# Patient Record
Sex: FEMALE | Race: AMERICAN INDIAN OR ALASKA NATIVE
[De-identification: names, ages, dates, MRNs, and addresses within clinical notes are randomized per-mention and may not be internally consistent; named-entity substitution may affect disease eponyms.]

---

## 2017-10-13 ENCOUNTER — HOSPITAL ENCOUNTER (EMERGENCY)
Dept: HOSPITAL 31 - C.ER | Age: 52
Discharge: HOME | End: 2017-10-13
Payer: COMMERCIAL

## 2017-10-13 VITALS — RESPIRATION RATE: 18 BRPM | TEMPERATURE: 97.9 F

## 2017-10-13 VITALS — BODY MASS INDEX: 24.2 KG/M2

## 2017-10-13 VITALS — DIASTOLIC BLOOD PRESSURE: 78 MMHG | HEART RATE: 72 BPM | OXYGEN SATURATION: 98 % | SYSTOLIC BLOOD PRESSURE: 132 MMHG

## 2017-10-13 DIAGNOSIS — M79.645: Primary | ICD-10-CM

## 2017-10-13 NOTE — RAD
PROCEDURE:  Left Hand Radiographs.



HISTORY:

5th distal phalanx injury, also 3rd, 4th, mildly  



COMPARISON:

None.



FINDINGS:



BONES:

Normal. No fracture. 



JOINTS:

Normal. No osteoarthritic changes. 



SOFT TISSUES:

Normal. 



OTHER FINDINGS:

None.



IMPRESSION:

Normal left hand radiographs.

## 2017-10-13 NOTE — C.PDOC
History Of Present Illness


52 yr old female presents to the ER stating she was trying to get a box out of 

a cash register tray which was jammed and in process she got her left distal 

fingers stick. Patient complaints of pain mostly to the left 5th digit with 

swelling and bruising. Patient states the pain is mild in the 3rd and 4th 

digit. Describes the pain as sharp and worse with movement and palpation, 

associated with swelling and ecchymosis. Denies arm pain, shoulder pain, 

weakness or numbness. 


Time Seen by Provider: 10/13/17 10:13


Chief Complaint (Nursing): Finger,Hand,&Wrist


History Per: Patient


History/Exam Limitations: no limitations


Onset/Duration Of Symptoms: Sudden Onset


Current Symptoms Are (Timing): Still Present


Quality: Sharp





Past Medical History


Reviewed: Historical Data, Nursing Documentation, Vital Signs


Vital Signs: 


 Last Vital Signs











Temp  97.9 F   10/13/17 10:00


 


Pulse  78   10/13/17 10:00


 


Resp  18   10/13/17 10:00


 


BP  144/83   10/13/17 10:00


 


Pulse Ox  99   10/13/17 10:39














- Medical History


PMH: Anemia, Asthma, Bronchitis


Surgical History: Tonsillectomy





- [facility] Procedures








APPLICATION OF SPLINT (03/08/15)


ARTIF RUPT MEMBRANES NEC (97)


LOW CERVICAL  (97)








Family History: States: No Known Family Hx





- Social History


Hx Tobacco Use: No


Hx Alcohol Use: Yes


Hx Substance Use: No





- Immunization History


Hx Tetanus Toxoid Vaccination: No


Hx Influenza Vaccination: No


Hx Pneumococcal Vaccination: Yes (2014)





Review Of Systems


Except As Marked, All Systems Reviewed And Found Negative.


Musculoskeletal: Positive for: Hand Pain (Left hand, distal fingers pain).  

Negative for: Shoulder Pain, Arm Pain


Neurological: Negative for: Weakness, Numbness





Physical Exam





- Physical Exam


Additional Physical Exam Comments: 


Appears: Well Appearing, Non-toxic


Skin: Normal Color


Head: Atraumatic. Normacephalic.


Eye(s): bilateral: EOMI


Oral: Moist mucosa. 


Neck: No Midline Cervical Tenderness, Supple


Chest: No Deformity, No Tenderness


Respiratory: No Accessory Muscle Use


Gastrointestinal/Abdominal: Soft. No Tenderness. No guarding. No rebound. 


Back: No Vertebral Tenderness


Extremity: Full ROM. Right Hand - 5th distal digit edema. Mild ecchymosis at 

the DIP joint. No subungual hematoma. Sensations to light touch intact. Cap 

Refill - <2 secs.


Pulses: Normal.


Neurological/Psych: Alert. Oriented x3. 


Gait: Steady. 





ED Course And Treatment


O2 Sat by Pulse Oximetry: 99 (RA)


Pulse Ox Interpretation: Normal





Medical Decision Making


Medical Decision Making: 


PLAN:


* X-Ray - Left Hand 


* Toradol IM 








X-Ray - Left Hand





XR no fx or dislocation as read by me.


Finger splint applied. Discharged home, f/u PMD, return to ED for worsening pain

, numbness, weakness, erythema, or any other problem.





Disposition





- Disposition


Disposition: HOME/ ROUTINE


Disposition Time: 10:51


Condition: STABLE


Prescriptions: 


Ibuprofen [Motrin] 1 tab PO Q6 #30 tab


Instructions:  Delroy Finger (ED)


Forms:  [facility] (English), Work Excuse





- Clinical Impression


Clinical Impression: 


 Finger pain








- Scribe Statement


The provider has reviewed the documentation as recorded by the Jefferyibe


Saritha Cabello


Provider Attestation: 


All medical record entries made by the Scribe were at my direction and 

personally dictated by me. I have reviewed the chart and agree that the record 

accurately reflects my personal performance of the history, physical exam, 

medical decision making, and the department course for this patient. I have 

also personally directed, reviewed, and agree with the discharge instructions 

and disposition.

## 2019-03-24 ENCOUNTER — HOSPITAL ENCOUNTER (OUTPATIENT)
Dept: HOSPITAL 31 - C.ER | Age: 54
Setting detail: OBSERVATION
LOS: 1 days | Discharge: HOME | End: 2019-03-25
Attending: FAMILY MEDICINE | Admitting: FAMILY MEDICINE
Payer: MEDICAID

## 2019-03-24 VITALS — RESPIRATION RATE: 20 BRPM

## 2019-03-24 VITALS — BODY MASS INDEX: 24.2 KG/M2

## 2019-03-24 DIAGNOSIS — J06.9: Primary | ICD-10-CM

## 2019-03-24 DIAGNOSIS — R07.81: ICD-10-CM

## 2019-03-24 DIAGNOSIS — Z82.49: ICD-10-CM

## 2019-03-24 DIAGNOSIS — J45.998: ICD-10-CM

## 2019-03-24 DIAGNOSIS — E86.0: ICD-10-CM

## 2019-03-24 DIAGNOSIS — E55.9: ICD-10-CM

## 2019-03-24 DIAGNOSIS — R73.03: ICD-10-CM

## 2019-03-24 LAB
ALBUMIN SERPL-MCNC: 4.4 {NULL, G/DL} (ref 3.5–5)
ALBUMIN/GLOB SERPL: 1.4 {NULL, NULL} (ref 1–2.1)
ALT SERPL-CCNC: 7 {NULL, U/L} (ref 9–52)
APTT BLD: 36 {NULL, SECONDS} (ref 21–34)
AST SERPL-CCNC: 18 {NULL, U/L} (ref 14–36)
BACTERIA #/AREA URNS HPF: (no result) {NULL, NULL}
BASOPHILS # BLD AUTO: 0 {NULL, K/UL} (ref 0–0.2)
BASOPHILS NFR BLD: 0.3 {NULL, %} (ref 0–2)
BILIRUB UR-MCNC: NEGATIVE {NULL, NULL}
BUN SERPL-MCNC: 22 {NULL, MG/DL} (ref 7–17)
CALCIUM SERPL-MCNC: 9.6 {NULL, MG/DL} (ref 8.6–10.4)
CK MB SERPL-MCNC: 0.28 {NULL, NG/ML} (ref 0–3.38)
CK MB SERPL-MCNC: 0.35 {NULL, NG/ML} (ref 0–3.38)
CK MB SERPL-MCNC: 0.39 {NULL, NG/ML} (ref 0–3.38)
D DIMER: < 200 {NULL, NG/MLDDU} (ref 0–243)
EOSINOPHIL # BLD AUTO: 0.2 {NULL, K/UL} (ref 0–0.7)
EOSINOPHIL NFR BLD: 1.6 {NULL, %} (ref 0–4)
ERYTHROCYTE [DISTWIDTH] IN BLOOD BY AUTOMATED COUNT: 13.7 {NULL, %} (ref 11.5–14.5)
GFR NON-AFRICAN AMERICAN: 58 {NULL, NULL}
GLUCOSE UR STRIP-MCNC: NORMAL {NULL, MG/DL}
HCG,QUALITATIVE URINE: NEGATIVE {NULL, NULL}
HGB BLD-MCNC: 13.4 {NULL, G/DL} (ref 11–16)
INR PPP: 1.2 {NULL, NULL}
LEUKOCYTE ESTERASE UR-ACNC: (no result) {NULL, LEU/UL}
LYMPHOCYTES # BLD AUTO: 3 {NULL, K/UL} (ref 1–4.3)
LYMPHOCYTES NFR BLD AUTO: 23.5 {NULL, %} (ref 20–40)
MCH RBC QN AUTO: 27.3 {NULL, PG} (ref 27–31)
MCHC RBC AUTO-ENTMCNC: 32.9 {NULL, G/DL} (ref 33–37)
MCV RBC AUTO: 82.9 {NULL, FL} (ref 81–99)
MONOCYTES # BLD: 0.7 {NULL, K/UL} (ref 0–0.8)
MONOCYTES NFR BLD: 5.5 {NULL, %} (ref 0–10)
NEUTROPHILS # BLD: 8.9 {NULL, K/UL} (ref 1.8–7)
NEUTROPHILS NFR BLD AUTO: 69.1 {NULL, %} (ref 50–75)
NRBC BLD AUTO-RTO: 0 {NULL, %} (ref 0–2)
PH UR STRIP: 5 {NULL, NULL} (ref 5–8)
PLATELET # BLD: 371 {NULL, K/UL} (ref 130–400)
PMV BLD AUTO: 8.7 {NULL, FL} (ref 7.2–11.7)
PROT UR STRIP-MCNC: NEGATIVE {NULL, MG/DL}
PROTHROMBIN TIME: 13.2 {NULL, SECONDS} (ref 9.7–12.2)
RBC # BLD AUTO: 4.9 {NULL, MIL/UL} (ref 3.8–5.2)
RBC # UR STRIP: NEGATIVE {NULL, NULL}
SP GR UR STRIP: 1.03 {NULL, NULL} (ref 1–1.03)
SQUAMOUS EPITHIAL: 3 {NULL, /HPF} (ref 0–5)
UROBILINOGEN UR-MCNC: NORMAL {NULL, MG/DL} (ref 0.2–1)
WBC # BLD AUTO: 12.9 {NULL, K/UL} (ref 4.8–10.8)

## 2019-03-24 PROCEDURE — 80346 BENZODIAZEPINES1-12: CPT

## 2019-03-24 PROCEDURE — 85730 THROMBOPLASTIN TIME PARTIAL: CPT

## 2019-03-24 PROCEDURE — 84443 ASSAY THYROID STIM HORMONE: CPT

## 2019-03-24 PROCEDURE — 97530 THERAPEUTIC ACTIVITIES: CPT

## 2019-03-24 PROCEDURE — 97116 GAIT TRAINING THERAPY: CPT

## 2019-03-24 PROCEDURE — 87086 URINE CULTURE/COLONY COUNT: CPT

## 2019-03-24 PROCEDURE — 85610 PROTHROMBIN TIME: CPT

## 2019-03-24 PROCEDURE — 80349 CANNABINOIDS NATURAL: CPT

## 2019-03-24 PROCEDURE — 87449 NOS EACH ORGANISM AG IA: CPT

## 2019-03-24 PROCEDURE — 84439 ASSAY OF FREE THYROXINE: CPT

## 2019-03-24 PROCEDURE — 36415 COLL VENOUS BLD VENIPUNCTURE: CPT

## 2019-03-24 PROCEDURE — 84484 ASSAY OF TROPONIN QUANT: CPT

## 2019-03-24 PROCEDURE — 80353 DRUG SCREENING COCAINE: CPT

## 2019-03-24 PROCEDURE — 99285 EMERGENCY DEPT VISIT HI MDM: CPT

## 2019-03-24 PROCEDURE — 70450 CT HEAD/BRAIN W/O DYE: CPT

## 2019-03-24 PROCEDURE — 80358 DRUG SCREENING METHADONE: CPT

## 2019-03-24 PROCEDURE — 94640 AIRWAY INHALATION TREATMENT: CPT

## 2019-03-24 PROCEDURE — 84703 CHORIONIC GONADOTROPIN ASSAY: CPT

## 2019-03-24 PROCEDURE — 90732 PPSV23 VACC 2 YRS+ SUBQ/IM: CPT

## 2019-03-24 PROCEDURE — 84100 ASSAY OF PHOSPHORUS: CPT

## 2019-03-24 PROCEDURE — 81001 URINALYSIS AUTO W/SCOPE: CPT

## 2019-03-24 PROCEDURE — 97161 PT EVAL LOW COMPLEX 20 MIN: CPT

## 2019-03-24 PROCEDURE — 83036 HEMOGLOBIN GLYCOSYLATED A1C: CPT

## 2019-03-24 PROCEDURE — 87070 CULTURE OTHR SPECIMN AEROBIC: CPT

## 2019-03-24 PROCEDURE — 87804 INFLUENZA ASSAY W/OPTIC: CPT

## 2019-03-24 PROCEDURE — 85025 COMPLETE CBC W/AUTO DIFF WBC: CPT

## 2019-03-24 PROCEDURE — 83992 ASSAY FOR PHENCYCLIDINE: CPT

## 2019-03-24 PROCEDURE — 87430 STREP A AG IA: CPT

## 2019-03-24 PROCEDURE — 81025 URINE PREGNANCY TEST: CPT

## 2019-03-24 PROCEDURE — 86738 MYCOPLASMA ANTIBODY: CPT

## 2019-03-24 PROCEDURE — 82784 ASSAY IGA/IGD/IGG/IGM EACH: CPT

## 2019-03-24 PROCEDURE — 80324 DRUG SCREEN AMPHETAMINES 1/2: CPT

## 2019-03-24 PROCEDURE — 86317 IMMUNOASSAY INFECTIOUS AGENT: CPT

## 2019-03-24 PROCEDURE — 93005 ELECTROCARDIOGRAM TRACING: CPT

## 2019-03-24 PROCEDURE — 80053 COMPREHEN METABOLIC PANEL: CPT

## 2019-03-24 PROCEDURE — 87040 BLOOD CULTURE FOR BACTERIA: CPT

## 2019-03-24 PROCEDURE — 71045 X-RAY EXAM CHEST 1 VIEW: CPT

## 2019-03-24 PROCEDURE — 82550 ASSAY OF CK (CPK): CPT

## 2019-03-24 PROCEDURE — 80345 DRUG SCREENING BARBITURATES: CPT

## 2019-03-24 PROCEDURE — 80061 LIPID PANEL: CPT

## 2019-03-24 PROCEDURE — 97165 OT EVAL LOW COMPLEX 30 MIN: CPT

## 2019-03-24 PROCEDURE — 82553 CREATINE MB FRACTION: CPT

## 2019-03-24 PROCEDURE — 86403 PARTICLE AGGLUT ANTBDY SCRN: CPT

## 2019-03-24 PROCEDURE — 80361 OPIATES 1 OR MORE: CPT

## 2019-03-24 PROCEDURE — 93306 TTE W/DOPPLER COMPLETE: CPT

## 2019-03-24 PROCEDURE — 82306 VITAMIN D 25 HYDROXY: CPT

## 2019-03-24 PROCEDURE — 85378 FIBRIN DEGRADE SEMIQUANT: CPT

## 2019-03-24 PROCEDURE — 90471 IMMUNIZATION ADMIN: CPT

## 2019-03-24 PROCEDURE — 83735 ASSAY OF MAGNESIUM: CPT

## 2019-03-24 RX ADMIN — FOLIC ACID SCH MLS/HR: 5 INJECTION, SOLUTION INTRAMUSCULAR; INTRAVENOUS; SUBCUTANEOUS at 17:07

## 2019-03-24 RX ADMIN — GUAIFENESIN SCH MG: 600 TABLET, EXTENDED RELEASE ORAL at 17:08
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
## 2019-03-24 NOTE — RAD
Date of service: 



03/24/2019



HISTORY:

Chest pain 



COMPARISON:

Comparison chest 11/30/2014 



TECHNIQUE:

1 view obtained.



FINDINGS:



LUNGS:

No active pulmonary disease.



PLEURA:

No significant pleural effusion identified, no pneumothorax apparent.



CARDIOVASCULAR:

No aortic atherosclerotic calcification present.



Normal cardiac size. No pulmonary vascular congestion. 



OSSEOUS STRUCTURES:

No significant abnormalities.



VISUALIZED UPPER ABDOMEN:

Normal.



OTHER FINDINGS:

None.



IMPRESSION:

No active disease.

## 2019-03-24 NOTE — C.PDOC
History Of Present Illness


52 y/o female presents to the ED complaining of feeling unwell since 2 days ago.

Patient reports she woke up 2 days ago with chest pain, associated with a 

headache and dizziness. She went to work but had continued symptoms, including 

sharp left-sided chest pain. + Nausea. No SOB. Otherwise patient denies any 

fevers, chills, vomiting, abdominal pain, cold sweats, or other associated 

complaints.





Time Seen by Provider: 19 08:32


Chief Complaint (Nursing): Chest Pain


History Per: Patient


History/Exam Limitations: no limitations


Onset/Duration Of Symptoms: Days (x 2)


Current Symptoms Are (Timing): Still Present


Associated Symptoms: Nausea





Past Medical History


Reviewed: Historical Data, Nursing Documentation, Vital Signs


Vital Signs: 





                                Last Vital Signs











Temp  98.4 F   19 08:15


 


Pulse  73   19 08:15


 


Resp  18   19 08:15


 


BP  146/83   19 08:15


 


Pulse Ox  99   19 08:15














- Medical History


PMH: Anemia, Asthma, Bronchitis


   Denies: Chronic Kidney Disease


Surgical History: Tonsillectomy





- [facility] Procedures











APPLICATION OF SPLINT (03/08/15)


ARTIF RUPT MEMBRANES NEC (97)


LOW CERVICAL  (97)








Family History: States: Hypertension (mother and younger sister)





- Social History


Hx Tobacco Use: No


Hx Alcohol Use: Yes


Hx Substance Use: No





- Immunization History


Hx Tetanus Toxoid Vaccination: No


Hx Influenza Vaccination: No


Hx Pneumococcal Vaccination: No





Review Of Systems


Except As Marked, All Systems Reviewed And Found Negative.


Constitutional: Negative for: Fever, Chills, Sweats


Eyes: Negative for: Vision Change


Cardiovascular: Positive for: Chest Pain.  Negative for: Palpitations


Respiratory: Negative for: Cough, Shortness of Breath


Gastrointestinal: Positive for: Nausea.  Negative for: Vomiting, Abdominal Pain,

Diarrhea


Genitourinary: Negative for: Dysuria, Frequency


Musculoskeletal: Negative for: Back Pain


Skin: Negative for: Rash


Neurological: Positive for: Headache, Dizziness.  Negative for: Weakness, 

Numbness, Change in Speech





Physical Exam





- Physical Exam


Appears: Non-toxic, No Acute Distress


Skin: Warm, Dry, No Diaphoretic


Head: Atraumatic, Normacephalic


Eye(s): bilateral: Normal Inspection, PERRL, EOMI


Oral Mucosa: Moist


Neck: Normal ROM


Chest: Symmetrical, No Deformity, No Tenderness


Cardiovascular: Rhythm Regular, No Murmur


Respiratory: Normal Breath Sounds, No Rales, No Rhonchi, No Wheezing


Gastrointestinal/Abdominal: Soft, No Tenderness, No Distention, No Guarding


Extremity: Normal ROM, No Calf Tenderness, No Deformity, No Swelling


Pulses: Left Dorsalis Pedis: Normal, Right Dorsalis Pedis: Normal


Neurological/Psych: Oriented x3, Normal Speech, Normal Cranial Nerves


Gait: Steady





ED Course And Treatment





- Laboratory Results


Result Diagrams: 


                                 19 09:19:


Lab Results: 





                                        











PT  13.2 SECONDS (9.7-12.2)  H  19:    


 


INR  1.2   19:    


 


APTT  36 SECONDS (21-34)  H  19:    








                                        











D-Dimer, Quantitative  < 200 ng/mlDDU (0-243)   19:    








                                        











Troponin I  < 0.0120 ng/mL (0.00-0.120)   19:    








                                        











Total Bilirubin  0.2 mg/dL (0.2-1.3)   19:    


 


AST  18 U/L (14-36)   19:    


 


ALT  7 U/L (9-52)  L D 19:    


 


Alkaline Phosphatase  100 U/L ()   19:    


 


Total Protein  7.5 g/dL (6.3-8.3)   19:    


 


Albumin  4.4 g/dL (3.5-5.0)   19:    


 


Globulin  3.1 gm/dL (2.2-3.9)   19:    


 


Albumin/Globulin Ratio  1.4  (1.0-2.1)   19:    








                                        











Urine Color  Yellow  (YELLOW)   19:    


 


Urine Clarity  Hazy  (Clear)   19:    


 


Urine pH  5.0  (5.0-8.0)   19:    


 


Ur Specific Gravity  1.026  (1.003-1.030)   19:01    


 


Urine Protein  Negative mg/dL (NEGATIVE)   19  09:01    


 


Urine Glucose (UA)  Normal mg/dL (Normal)   19  09:01    


 


Urine Ketones  Negative mg/dL (NEGATIVE)   19  09:01    


 


Urine Blood  Negative  (NEGATIVE)   19  09:01    


 


Urine Nitrate  Negative  (NEGATIVE)   19  09:01    


 


Urine Bilirubin  Negative  (NEGATIVE)   19  09:01    


 


Urine Urobilinogen  Normal mg/dL (0.2-1.0)   19  09:01    


 


Ur Leukocyte Esterase  1+ Lei/uL (Negative)  H  19  09:01    


 


Urine WBC (Auto)  2 /hpf (0-5)   19  09:01    


 


Urine RBC (Auto)  2 /hpf (0-3)   19  09:01    


 


Ur Squamous Epith Cells  3 /hpf (0-5)   19  09:01    


 


Urine Bacteria  Rare  (<OCC)   19  09:01    


 


Urine HCG, Qual  Negative  (NEGATIVE)   19  09:01    








                                        











Urine HCG, Qual  Negative  (NEGATIVE)   19  09:01    











Urine Pregnancy POC: Negative


ECG: Interpreted By Me


ECG Rhythm: Sinus Rhythm


Interpretation Of ECG: Flipped Ts diffusely


Rate From EC


O2 Sat by Pulse Oximetry: 99 (on RA)


Pulse Ox Interpretation: Normal





- CT Scan/US


  ** Head CT


Other Rad Studies (CT/US): Read By Radiologist, Radiology Report Reviewed


CT/US Interpretation: Accession No. : [id number].  Patient Name / ID : 

MIGUEL DICK  / [id number].  Exam Date : 2019 09:55:44 ( Approved ).  Hema

dy Comment :  Sex / Age : F  / 53Y.  Creator : Paula Sahu.  Dictator : 

Jemal Holloway MD.   :  Approver : Jemal Holloway MD.  

Approver2 :  Report Date : 2019 10:02:49.  My Comment :  

********************************************************************************


***.  Date of service:  2019.  PROCEDURE:  CT HEAD WITHOUT CONTRAST.  

HISTORY:  dizzy, lightheaded.  COMPARISON:  None available.  TECHNIQUE:  Axial 

computed tomography images were obtained through the head/brain without 

intravenous contrast.  Radiation dose:  Total exam DLP = 1141.18 mGy-cm.  This 

CT exam was performed using one or more of the following dose reduction 

techniques: Automated exposure control, adjustment of the mA and/or kV according

to patient size, and/or use of iterative reconstruction technique.  FINDINGS:  

HEMORRHAGE:  No intracranial hemorrhage.  BRAIN:  No mass effect or edema.  

Minimal periventricular white matter lucency consistent with microvascular white

matter ischemic change.  No evidence of acute infarct.  No atrophy.  VENTRICLES:

 Unremarkable. No hydrocephalus.  CALVARIUM:  Unremarkable.  PARANASAL SINUSES: 

Unremarkable as visualized. No significant inflammatory changes.  MASTOID AIR 

CELLS:  Unremarkable as visualized. No inflammatory changes.  OTHER FINDINGS:  

None.  IMPRESSION:  Minimal chronic white matter ischemic change.  No 

intracranial mass, hemorrhage or evidence of acute infarct.


Progress Note: EKG reviewed, shows some flipped Ts diffusely. CXR and labs 

ordered. CT Head obtained, shows no acute intracranial abnormality. Labs 

reviewed, trop negative. D-dimer is wnl.





- Physician Consult Information


Physician Contacted: Korin Hodges


Outcome Of Conversation: Accepted to tele for observation





Disposition





- Disposition


Disposition: HOSPITALIZED


Disposition Time: 12:53


Condition: FAIR





- Clinical Impression


Clinical Impression: 


 Chest pain, Lightheadedness








- PA / NP / Resident Statement


MD/DO has reviewed & agrees with the documentation as recorded.





- Scribe Statement


The provider has reviewed the documentation as recorded by the Scribcarlo Lan





All medical record entries made by the Scribe were at my direction and 

personally dictated by me. I have reviewed the chart and agree that the record 

accurately reflects my personal performance of the history, physical exam, 

medical decision making, and the department course for this patient. I have also

personally directed, reviewed, and agree with the discharge instructions and 

disposition.





Decision To Admit





- Pt Status Changed To:


Hospital Disposition Of: Observation





- .


Bed Request Type: Telemetry


Admitting Physician: Korin Hodges


Patient Diagnosis: 


 Chest pain, Lightheadedness

## 2019-03-24 NOTE — CT
Date of service: 



03/24/2019



PROCEDURE:  CT HEAD WITHOUT CONTRAST.



HISTORY:

dizzy, lightheaded



COMPARISON:

None available.



TECHNIQUE:

Axial computed tomography images were obtained through the head/brain 

without intravenous contrast.  



Radiation dose:



Total exam DLP = 1141.18 mGy-cm.



This CT exam was performed using one or more of the following dose 

reduction techniques: Automated exposure control, adjustment of the 

mA and/or kV according to patient size, and/or use of iterative 

reconstruction technique.



FINDINGS:



HEMORRHAGE:

No intracranial hemorrhage. 



BRAIN:

No mass effect or edema.  Minimal periventricular white matter 

lucency consistent with microvascular white matter ischemic change.  

No evidence of acute infarct.  No atrophy.



VENTRICLES:

Unremarkable. No hydrocephalus. 



CALVARIUM:

Unremarkable.



PARANASAL SINUSES:

Unremarkable as visualized. No significant inflammatory changes.



MASTOID AIR CELLS:

Unremarkable as visualized. No inflammatory changes.



OTHER FINDINGS:

None.



IMPRESSION:

Minimal chronic white matter ischemic change.  No intracranial mass, 

hemorrhage or evidence of acute infarct.

## 2019-03-25 VITALS
OXYGEN SATURATION: 98 % | TEMPERATURE: 97.9 F | DIASTOLIC BLOOD PRESSURE: 88 MMHG | HEART RATE: 81 BPM | SYSTOLIC BLOOD PRESSURE: 140 MMHG

## 2019-03-25 LAB
ALBUMIN SERPL-MCNC: 4 {NULL, G/DL} (ref 3.5–5)
ALBUMIN/GLOB SERPL: 1.4 {NULL, NULL} (ref 1–2.1)
ALT SERPL-CCNC: 13 {NULL, U/L} (ref 9–52)
AST SERPL-CCNC: 23 {NULL, U/L} (ref 14–36)
BASOPHILS # BLD AUTO: 0 {NULL, K/UL} (ref 0–0.2)
BASOPHILS NFR BLD: 0.3 {NULL, %} (ref 0–2)
BUN SERPL-MCNC: 14 {NULL, MG/DL} (ref 7–17)
CALCIUM SERPL-MCNC: 9.3 {NULL, MG/DL} (ref 8.6–10.4)
EOSINOPHIL # BLD AUTO: 0.3 {NULL, K/UL} (ref 0–0.7)
EOSINOPHIL NFR BLD: 2.5 {NULL, %} (ref 0–4)
ERYTHROCYTE [DISTWIDTH] IN BLOOD BY AUTOMATED COUNT: 14.2 {NULL, %} (ref 11.5–14.5)
GFR NON-AFRICAN AMERICAN: > 60 {NULL, NULL}
HDLC SERPL-MCNC: 35 {NULL, MG/DL} (ref 30–70)
HGB BLD-MCNC: 13 {NULL, G/DL} (ref 11–16)
LDLC SERPL-MCNC: 104 {NULL, MG/DL} (ref 0–129)
LYMPHOCYTES # BLD AUTO: 2.9 {NULL, K/UL} (ref 1–4.3)
LYMPHOCYTES NFR BLD AUTO: 26.5 {NULL, %} (ref 20–40)
MCH RBC QN AUTO: 26.5 {NULL, PG} (ref 27–31)
MCHC RBC AUTO-ENTMCNC: 32.1 {NULL, G/DL} (ref 33–37)
MCV RBC AUTO: 82.7 {NULL, FL} (ref 81–99)
MONOCYTES # BLD: 0.5 {NULL, K/UL} (ref 0–0.8)
MONOCYTES NFR BLD: 4.6 {NULL, %} (ref 0–10)
NEUTROPHILS # BLD: 7.4 {NULL, K/UL} (ref 1.8–7)
NEUTROPHILS NFR BLD AUTO: 66.1 {NULL, %} (ref 50–75)
NRBC BLD AUTO-RTO: 0.1 {NULL, %} (ref 0–2)
PLATELET # BLD: 352 {NULL, K/UL} (ref 130–400)
PMV BLD AUTO: 8.9 {NULL, FL} (ref 7.2–11.7)
RBC # BLD AUTO: 4.89 {NULL, MIL/UL} (ref 3.8–5.2)
STREP PNEUMONIAE: NEGATIVE
WBC # BLD AUTO: 11.1 {NULL, K/UL} (ref 4.8–10.8)

## 2019-03-25 RX ADMIN — MAGNESIUM SULFATE IN DEXTROSE SCH MLS/HR: 10 INJECTION, SOLUTION INTRAVENOUS at 15:43

## 2019-03-25 RX ADMIN — FOLIC ACID SCH: 5 INJECTION, SOLUTION INTRAMUSCULAR; INTRAVENOUS; SUBCUTANEOUS at 10:31

## 2019-03-25 RX ADMIN — GUAIFENESIN SCH MG: 600 TABLET, EXTENDED RELEASE ORAL at 10:29

## 2019-03-25 RX ADMIN — MAGNESIUM SULFATE IN DEXTROSE SCH MLS/HR: 10 INJECTION, SOLUTION INTRAVENOUS at 16:42
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
## 2019-03-25 NOTE — CARD
--------------- APPROVED REPORT --------------





Date of service: 03/25/2019



EXAM: Two-dimensional and M-mode echocardiogram with Doppler and 

color Doppler.



Other Information 

Quality : GoodRhythm : 



INDICATION

Chest Pain Vertigo, Asthma



2D DIMENSIONS 

IVSd1.0   (0.7-1.1cm)Aortic Root (2D)2.6   (2.0-3.7cm)

LVDd4.3   (3.9-5.9cm)PWd0.9   (0.7-1.1cm)

LA Uzdwin28   (18-58mL)LVDs3.0   (2.5-4.0cm)

FS (%) 31.4   %LVEF (%)59.6   (>50%)

LVEF (Angeles's)58 %IVC0.00 cm



M-Mode DIMENSIONS 

Left Atrium (MM)3.36   (2.5-4.0cm)IVSd0.90   (0.7-1.1cm)

Aortic Root2.06   (2.2-3.7cm)LVDd4.57   (4.0-5.6cm)

Aortic Cusp Exc.1.72   (1.5-2.0cm)PWd0.79   (0.7-1.1cm)

FS (%) 35   %LVDs2.54   (2.0-3.8cm)

LVEF (%)65   (>50%)



Mitral Valve

MV E Ojkbsxsq53.3cm/sMV A Byjnpbcc264.8cm/sE/A ratio0.7



TDI

Lateral E' Peak V5.64cm/sMedial E' Peak V5.64cm/sE/Lateral E'15.8

E/Medial E'15.8



Tricuspid Valve

TR Peak [id number]/sTR Peak [id number]



 LEFT VENTRICLE 

The left ventricle is normal size.

There is normal left ventricular wall thickness.

The left ventricular function is normal.

The left ventricular ejection fraction is within the normal range.

There is normal LV segmental wall motion.

Transmitral Doppler flow pattern is Grade I-abnormal relaxation 

pattern.



 RIGHT VENTRICLE 

The right ventricle is normal size.

There is normal right ventricular wall thickness.

The right ventricular systolic function is normal.



 ATRIA 

The left atrium is borderline dilated.

The right atrium size is normal.



 AORTIC VALVE 

The aortic valve is mildly thickened.

There is trace aortic regurgitation.

There is no aortic valvular stenosis. 



 MITRAL VALVE 

The mitral valve is normal in structure.

There is no evidence of mitral valve prolapse.

There is no mitral valve stenosis.



 TRICUSPID VALVE 

The tricuspid valve is normal in structure.

There is trace tricuspid regurgitation.



 PULMONIC VALVE 

The pulmonary valve is normal in structure.

There is trace pulmonic valvular regurgitation. 



 GREAT VESSELS 

The aortic root is normal in size.

The IVC is normal in size and collapses >50% with inspiration.



 PERICARDIAL EFFUSION 

There is no pericardial effusion.



<Conclusion>

There is normal left ventricular wall thickness.

The left ventricular function is normal.

The left ventricular ejection fraction is within the normal range.

There is normal LV segmental wall motion.

Transmitral Doppler flow pattern is Grade I-abnormal relaxation 

pattern.

## 2019-03-25 NOTE — CARD
--------------- APPROVED REPORT --------------





Date of service: 03/24/2019



EKG Measurement

Heart [id number]

[id number]

[id number]

[id number]

[id number]



<Conclusion>

Normal sinus rhythm

Nonspecific T wave abnormality

Abnormal ECG